# Patient Record
Sex: FEMALE | Race: WHITE
[De-identification: names, ages, dates, MRNs, and addresses within clinical notes are randomized per-mention and may not be internally consistent; named-entity substitution may affect disease eponyms.]

---

## 2015-07-24 VITALS — SYSTOLIC BLOOD PRESSURE: 141 MMHG | DIASTOLIC BLOOD PRESSURE: 75 MMHG

## 2020-02-04 ENCOUNTER — HOSPITAL ENCOUNTER (OUTPATIENT)
Dept: HOSPITAL 63 - LAB | Age: 65
Discharge: HOME | End: 2020-02-04
Attending: PSYCHIATRY & NEUROLOGY
Payer: MEDICARE

## 2020-02-04 DIAGNOSIS — F03.90: ICD-10-CM

## 2020-02-04 DIAGNOSIS — E55.9: ICD-10-CM

## 2020-02-04 DIAGNOSIS — R41.3: Primary | ICD-10-CM

## 2020-02-04 DIAGNOSIS — Z11.59: ICD-10-CM

## 2020-02-04 LAB
ALBUMIN SERPL-MCNC: 3.9 G/DL (ref 3.4–5)
ALBUMIN/GLOB SERPL: 1.1 {RATIO} (ref 1–1.7)
ALP SERPL-CCNC: 60 U/L (ref 46–116)
ALT SERPL-CCNC: 22 U/L (ref 14–59)
ANION GAP SERPL CALC-SCNC: 10 MMOL/L (ref 6–14)
AST SERPL-CCNC: 27 U/L (ref 15–37)
BASOPHILS # BLD AUTO: 0.1 X10^3/UL (ref 0–0.2)
BASOPHILS NFR BLD: 1 % (ref 0–3)
BILIRUB SERPL-MCNC: 0.2 MG/DL (ref 0.2–1)
BUN/CREAT SERPL: 12 (ref 6–20)
CA-I SERPL ISE-MCNC: 12 MG/DL (ref 7–20)
CALCIUM SERPL-MCNC: 9.1 MG/DL (ref 8.5–10.1)
CHLORIDE SERPL-SCNC: 106 MMOL/L (ref 98–107)
CO2 SERPL-SCNC: 27 MMOL/L (ref 21–32)
CREAT SERPL-MCNC: 1 MG/DL (ref 0.6–1)
EOSINOPHIL NFR BLD: 0.2 X10^3/UL (ref 0–0.7)
EOSINOPHIL NFR BLD: 2 % (ref 0–3)
ERYTHROCYTE [DISTWIDTH] IN BLOOD BY AUTOMATED COUNT: 13.9 % (ref 11.5–14.5)
GFR SERPLBLD BASED ON 1.73 SQ M-ARVRAT: 55.6 ML/MIN
GLOBULIN SER-MCNC: 3.7 G/DL (ref 2.2–3.8)
GLUCOSE SERPL-MCNC: 80 MG/DL (ref 70–99)
HCT VFR BLD CALC: 40.3 % (ref 36–47)
HGB BLD-MCNC: 13.4 G/DL (ref 12–15.5)
LYMPHOCYTES # BLD: 3.3 X10^3/UL (ref 1–4.8)
LYMPHOCYTES NFR BLD AUTO: 46 % (ref 24–48)
MCH RBC QN AUTO: 34 PG (ref 25–35)
MCHC RBC AUTO-ENTMCNC: 33 G/DL (ref 31–37)
MCV RBC AUTO: 104 FL (ref 79–100)
MONO #: 0.6 X10^3/UL (ref 0–1.1)
MONOCYTES NFR BLD: 8 % (ref 0–9)
NEUT #: 3 X10^3UL (ref 1.8–7.7)
NEUTROPHILS NFR BLD AUTO: 43 % (ref 31–73)
PLATELET # BLD AUTO: 59 X10^3/UL (ref 140–400)
PLATELET # BLD EST: (no result) 10*3/UL
POTASSIUM SERPL-SCNC: 4.1 MMOL/L (ref 3.5–5.1)
PROT SERPL-MCNC: 7.6 G/DL (ref 6.4–8.2)
RBC # BLD AUTO: 3.89 X10^6/UL (ref 3.5–5.4)
SODIUM SERPL-SCNC: 143 MMOL/L (ref 136–145)
T3 SERPL-MCNC: 95 NG/DL (ref 71–180)
T4 SERPL-MCNC: 6.3 UG/DL (ref 4.5–12)
WBC # BLD AUTO: 7.1 X10^3/UL (ref 4–11)

## 2020-02-04 PROCEDURE — 84436 ASSAY OF TOTAL THYROXINE: CPT

## 2020-02-04 PROCEDURE — 85025 COMPLETE CBC W/AUTO DIFF WBC: CPT

## 2020-02-04 PROCEDURE — 86592 SYPHILIS TEST NON-TREP QUAL: CPT

## 2020-02-04 PROCEDURE — 36415 COLL VENOUS BLD VENIPUNCTURE: CPT

## 2020-02-04 PROCEDURE — 80053 COMPREHEN METABOLIC PANEL: CPT

## 2020-02-04 PROCEDURE — 84443 ASSAY THYROID STIM HORMONE: CPT

## 2020-02-04 PROCEDURE — 82607 VITAMIN B-12: CPT

## 2020-02-04 PROCEDURE — 84480 ASSAY TRIIODOTHYRONINE (T3): CPT

## 2020-02-04 PROCEDURE — 82306 VITAMIN D 25 HYDROXY: CPT

## 2020-08-01 ENCOUNTER — HOSPITAL ENCOUNTER (EMERGENCY)
Dept: HOSPITAL 63 - ER | Age: 65
Discharge: HOME | End: 2020-08-01
Payer: MEDICARE

## 2020-08-01 VITALS
SYSTOLIC BLOOD PRESSURE: 135 MMHG | DIASTOLIC BLOOD PRESSURE: 73 MMHG | DIASTOLIC BLOOD PRESSURE: 73 MMHG | SYSTOLIC BLOOD PRESSURE: 135 MMHG | DIASTOLIC BLOOD PRESSURE: 73 MMHG | SYSTOLIC BLOOD PRESSURE: 135 MMHG

## 2020-08-01 VITALS — HEIGHT: 69 IN | BODY MASS INDEX: 26.78 KG/M2 | WEIGHT: 180.78 LBS

## 2020-08-01 DIAGNOSIS — Z88.5: ICD-10-CM

## 2020-08-01 DIAGNOSIS — K21.9: ICD-10-CM

## 2020-08-01 DIAGNOSIS — R91.8: Primary | ICD-10-CM

## 2020-08-01 LAB
ALBUMIN SERPL-MCNC: 3.9 G/DL (ref 3.4–5)
ALBUMIN/GLOB SERPL: 0.9 {RATIO} (ref 1–1.7)
ALP SERPL-CCNC: 89 U/L (ref 46–116)
ALT SERPL-CCNC: 13 U/L (ref 14–59)
ANION GAP SERPL CALC-SCNC: 9 MMOL/L (ref 6–14)
AST SERPL-CCNC: 15 U/L (ref 15–37)
BASOPHILS # BLD AUTO: 0.1 X10^3/UL (ref 0–0.2)
BASOPHILS NFR BLD: 1 % (ref 0–3)
BILIRUB SERPL-MCNC: 0.4 MG/DL (ref 0.2–1)
BUN/CREAT SERPL: 13 (ref 6–20)
CA-I SERPL ISE-MCNC: 12 MG/DL (ref 7–20)
CALCIUM SERPL-MCNC: 9.4 MG/DL (ref 8.5–10.1)
CHLORIDE SERPL-SCNC: 99 MMOL/L (ref 98–107)
CO2 SERPL-SCNC: 28 MMOL/L (ref 21–32)
CREAT SERPL-MCNC: 0.9 MG/DL (ref 0.6–1)
EOSINOPHIL NFR BLD: 0.3 X10^3/UL (ref 0–0.7)
EOSINOPHIL NFR BLD: 2 % (ref 0–3)
ERYTHROCYTE [DISTWIDTH] IN BLOOD BY AUTOMATED COUNT: 14 % (ref 11.5–14.5)
GFR SERPLBLD BASED ON 1.73 SQ M-ARVRAT: 62.6 ML/MIN
GLOBULIN SER-MCNC: 4.2 G/DL (ref 2.2–3.8)
GLUCOSE SERPL-MCNC: 115 MG/DL (ref 70–99)
HCT VFR BLD CALC: 39.4 % (ref 36–47)
HGB BLD-MCNC: 13.4 G/DL (ref 12–15.5)
LYMPHOCYTES # BLD: 2.8 X10^3/UL (ref 1–4.8)
LYMPHOCYTES NFR BLD AUTO: 23 % (ref 24–48)
MCH RBC QN AUTO: 35 PG (ref 25–35)
MCHC RBC AUTO-ENTMCNC: 34 G/DL (ref 31–37)
MCV RBC AUTO: 103 FL (ref 79–100)
MONO #: 0.9 X10^3/UL (ref 0–1.1)
MONOCYTES NFR BLD: 8 % (ref 0–9)
NEUT #: 8.3 X10^3UL (ref 1.8–7.7)
NEUTROPHILS NFR BLD AUTO: 67 % (ref 31–73)
PLATELET # BLD AUTO: 75 X10^3/UL (ref 140–400)
PLATELET # BLD EST: (no result) 10*3/UL
POLYCHROMASIA BLD QL SMEAR: SLIGHT
POTASSIUM SERPL-SCNC: 3.7 MMOL/L (ref 3.5–5.1)
PROT SERPL-MCNC: 8.1 G/DL (ref 6.4–8.2)
RBC # BLD AUTO: 3.83 X10^6/UL (ref 3.5–5.4)
SODIUM SERPL-SCNC: 136 MMOL/L (ref 136–145)
WBC # BLD AUTO: 12.4 X10^3/UL (ref 4–11)

## 2020-08-01 PROCEDURE — 99285 EMERGENCY DEPT VISIT HI MDM: CPT

## 2020-08-01 PROCEDURE — 85025 COMPLETE CBC W/AUTO DIFF WBC: CPT

## 2020-08-01 PROCEDURE — 80053 COMPREHEN METABOLIC PANEL: CPT

## 2020-08-01 PROCEDURE — 71260 CT THORAX DX C+: CPT

## 2020-08-01 PROCEDURE — 99284 EMERGENCY DEPT VISIT MOD MDM: CPT

## 2020-08-01 PROCEDURE — 36415 COLL VENOUS BLD VENIPUNCTURE: CPT

## 2020-08-01 NOTE — RAD
CT chest with contrast.

 

HISTORY: Lung mass

 

CT scan the chest was done using 75 mL Omnipaque 300 contrast. There is no

recent study for comparison. There is an old chest x-ray from 2015. 

Thyroid is homogeneous. There is no mediastinal adenopathy. There is no 

pleural effusion. Visualized portions the liver and spleen are 

unremarkable. Adrenal glands are normal. Left lung is free of infiltrates 

or nodules. There is a cavitary mass in the right upper lobe. There is an 

air-fluid level within the mass. There is mild infiltrate surrounding the 

mass especially peripherally. An abscess can have this pattern, cavitary 

lung cancer is possible. TB can have this pattern.

 

IMPRESSION:

1. Cavitary right upper lobe lung mass with surrounding infiltrate.

 

 

 

 

 

 

 

 

 

PQRS Compliance Statement:

 

One or more of the following individualized dose reduction techniques were

utilized for this examination:  

1. Automated exposure control  

2. Adjustment of the mA and/or kV according to patient size  

3. Use of iterative reconstruction technique

 

Electronically signed by: Slim Urias MD (8/1/2020 1:10 PM) [facility]

## 2020-08-01 NOTE — PHYS DOC
Past History


Past Medical History:  Anxiety, Bipolar, Depression, GERD


Past Surgical History:  Cholecystectomy, Hysterectomy


Alcohol Use:  Occasionally


Drug Use:  None





Adult General


Chief Complaint


Chief Complaint:  SHORTNESS OF BREATH





HPI


HPI





Patient with dementia and Parkinson's presents for shortness of breath.  

Patient's PCP is Dr. Pena who is in the middle of a hemoptysis/thoracic 

mass work-up in outpatient setting.  Patient has been more dyspneic, having 

episodes of hemoptysis, and overall generalized fatigue.  Patient seen by PCP 

yesterday and was advised to go to ED for evaluation.  Patient did not comply.  

Nonetheless, patient's daughter who heard of recommendation was concerned that 

her mother did not comply with PCPs orders and took patient to our ED for 

evaluation today.  On arrival, patient complains of chronic shortness of breath.

 She denies any constitutional symptoms.  She has not had any episodes of 

hemoptysis.  She is pending CT chest with contrast this upcoming Monday to 

better classify a concerning right lung lesion found by x-ray performed 

yesterday by PCP





Review of Systems


Review of Systems


Fourteen body systems of review of systems have been reviewed. See HPI for 

pertinent positives and negative responses, other wise all other systems are 

negative, non-pertinent or non-contributory





Allergies


Allergies





Allergies








Coded Allergies Type Severity Reaction Last Updated Verified


 


  codeine Allergy Unknown  7/24/15 No











Physical Exam


Physical Exam





Constitutional: Looks stated age, thin appearing, no acute distress, non-toxic 

appearance. []


HENT: Normocephalic, atraumatic, bilateral external ears normal, oropharynx 

moist, no oral exudates, nose normal. []


Eyes: PERRLA, EOMI, conjunctiva normal, no discharge. [] 


Neck: Normal range of motion, no tenderness, supple, no stridor. [] 


Cardiovascular:Heart rate regular rhythm, no murmur []


Lungs & Thorax: Coarse breath sounds to auscultation bilaterally, breathing 

through pursed lips with mild accessory muscle use of scalenes []


Abdomen: Bowel sounds normal, soft, no tenderness, no masses, no pulsatile 

masses. [] 


Skin: Warm, dry, no erythema, no rash. [] 


Back: No tenderness, no CVA tenderness. [] 


Extremities: No tenderness, no cyanosis, no clubbing, ROM grossly intact, no 

edema. [] 


Neurologic: Alert and oriented X 3, grossly intact motor and sensory function, 

no focal deficits noted. []


Psychologic: Affect normal, judgement normal, depressed mood. []





Current Patient Data


Vital Signs





Vital Signs








  Date Time  Temp Pulse Resp B/P (MAP) Pulse Ox O2 Delivery O2 Flow Rate FiO2


 


8/1/20 11:44 97.0 79 16 135/73 (93) 96 Room Air  








Lab Results





Laboratory Tests








Test


 8/1/20


12:09


 


White Blood Count 12.4 x10^3/uL 


 


Red Blood Count 3.83 x10^6/uL 


 


Hemoglobin 13.4 g/dL 


 


Hematocrit 39.4 % 


 


Mean Corpuscular Volume 103 fL 


 


Mean Corpuscular Hemoglobin 35 pg 


 


Mean Corpuscular Hemoglobin


Concent 34 g/dL 





 


Red Cell Distribution Width 14.0 % 


 


Platelet Count 75 x10^3/uL 


 


Neutrophils (%) (Auto) 67 % 


 


Lymphocytes (%) (Auto) 23 % 


 


Monocytes (%) (Auto) 8 % 


 


Eosinophils (%) (Auto) 2 % 


 


Basophils (%) (Auto) 1 % 


 


Neutrophils # (Auto) 8.3 x10^3uL 


 


Lymphocytes # (Auto) 2.8 x10^3/uL 


 


Monocytes # (Auto) 0.9 x10^3/uL 


 


Eosinophils # (Auto) 0.3 x10^3/uL 


 


Basophils # (Auto) 0.1 x10^3/uL 


 


Platelet Estimate Decreased 


 


Large Platelets Few 


 


Giant Platelets Few 


 


Polychromasia Slight 


 


Sodium Level 136 mmol/L 


 


Potassium Level 3.7 mmol/L 


 


Chloride Level 99 mmol/L 


 


Carbon Dioxide Level 28 mmol/L 


 


Anion Gap 9 


 


Blood Urea Nitrogen 12 mg/dL 


 


Creatinine 0.9 mg/dL 


 


Estimated GFR


(Cockcroft-Gault) 62.6 





 


BUN/Creatinine Ratio 13 


 


Glucose Level 115 mg/dL 


 


Calcium Level 9.4 mg/dL 


 


Total Bilirubin 0.4 mg/dL 


 


Aspartate Amino Transf


(AST/SGOT) 15 U/L 





 


Alanine Aminotransferase


(ALT/SGPT) 13 U/L 





 


Alkaline Phosphatase 89 U/L 


 


Total Protein 8.1 g/dL 


 


Albumin 3.9 g/dL 


 


Albumin/Globulin Ratio 0.9 








Current Medications








 Medications


  (Trade)  Dose


 Ordered  Sig/Lucy


 Route


 PRN Reason  Start Time


 Stop Time Status Last Admin


Dose Admin


 


 Iohexol


  (Omnipaque 300


 Mg/ml)  75 ml  1X  ONCE


 IV


   8/1/20 12:00


 8/1/20 12:10 DC  














EKG


EKG


[]





Radiology/Procedures


Radiology/Procedures





PROCEDURE: CT CHEST W/CONTRAST





CT chest with contrast.


 


HISTORY: Lung mass


 


CT scan the chest was done using 75 mL Omnipaque 300 contrast. There is no


recent study for comparison. There is an old chest x-ray from 2015. 


Thyroid is homogeneous. There is no mediastinal adenopathy. There is no 


pleural effusion. Visualized portions the liver and spleen are 


unremarkable. Adrenal glands are normal. Left lung is free of infiltrates 


or nodules. There is a cavitary mass in the right upper lobe. There is an 


air-fluid level within the mass. There is mild infiltrate surrounding the 


mass especially peripherally. An abscess can have this pattern, cavitary 


lung cancer is possible. TB can have this pattern.


 


IMPRESSION:


1. Cavitary right upper lobe lung mass with surrounding infiltrate.





 


PQRS Compliance Statement:


 


One or more of the following individualized dose reduction techniques were


utilized for this examination:  


1. Automated exposure control  


2. Adjustment of the mA and/or kV according to patient size  


3. Use of iterative reconstruction technique


 


Electronically signed by: Slim Urias MD (8/1/2020 1:10 PM) [facility]











Course & Med Decision Making


Course & Med Decision Making


Vital signs stable, comprehensive history and physical exam nonemergent for 

emergent invasive/surgical intervention


Pertinent labs and imaging performed, images were reviewed in depth with both 

patient and daughter


PCP was called and concerning finding of right upper lobe cavitary lesion was 

discussed, patient has pending referral to Dr. Lemos, pulmonologist at [facility]. 


I discussed patient's ED course, fact that she is hemodynamically stable, well-

appearing, and showing no signs or symptoms indicative of antibiotic use


Joint decision between myself, PCP, patient, and daughter to discharge home in 

stable condition with continued close outpatient follow-up


Strict return precautions discussed at length with good understanding by patient

 and daughter, all questions and concerns addressed prior to departure





Dragon Disclaimer


Dragon Disclaimer


This electronic medical record was generated, in whole or in part, using a voice

 recognition dictation system.





Departure


Departure:


Impression:  


   Primary Impression:  


   Cavitating mass in right upper lung lobe


Disposition:  01 HOME/RESIDENCE PRIOR TO ADM


Condition:  STABLE


Referrals:  


MARILEE PENA MD (PCP)





Additional Instructions:  


As discussed prior to your ED departure, your ED course and CT findings were 

discussed with your PCP, Dr. Pena


You were previously referred to Dr. Lemos, who is a pulmonologist (lung doctor) 

who is based out of [facility]


Your PCP was sent information regarding today's visit in addition to a copy of 

your CT scan image results





Justification of Admission:


Justification of Admission:


Justification of Admission Dx:  N/A











IRENE RAY DO                  Aug 1, 2020 11:43

## 2020-08-19 ENCOUNTER — HOSPITAL ENCOUNTER (OUTPATIENT)
Dept: HOSPITAL 63 - CT | Age: 65
Discharge: HOME | End: 2020-08-19
Payer: MEDICARE

## 2020-08-19 DIAGNOSIS — R91.8: Primary | ICD-10-CM

## 2020-08-19 PROCEDURE — 71046 X-RAY EXAM CHEST 2 VIEWS: CPT

## 2020-08-19 NOTE — RAD
EXAM: CHEST 2 VIEWS.

 

HISTORY: Lung mass.

 

COMPARISON: 08/01/2020, 07/24/2015.

 

FINDINGS: Frontal and lateral views of the chest are obtained.

 

A cavitary lesion containing an air-fluid level in the inferior aspect of 

the right upper lobe measures 5.0 x 4.7 cm. There is no pneumothorax or 

pleural effusion. The heart is not enlarged.

 

IMPRESSION:

1. 5.0 cm cavitary mass within the right upper lobe as seen on prior CT.

 

Electronically signed by: TOM Rice MD (8/19/2020 10:13 AM) 

[id number]

## 2020-08-24 ENCOUNTER — HOSPITAL ENCOUNTER (OUTPATIENT)
Dept: HOSPITAL 61 - INTRAD | Age: 65
Discharge: HOME | End: 2020-08-24
Attending: INTERNAL MEDICINE
Payer: MEDICARE

## 2020-08-24 VITALS — SYSTOLIC BLOOD PRESSURE: 118 MMHG | DIASTOLIC BLOOD PRESSURE: 67 MMHG

## 2020-08-24 VITALS — BODY MASS INDEX: 28.32 KG/M2 | HEIGHT: 61 IN | WEIGHT: 150 LBS

## 2020-08-24 VITALS — DIASTOLIC BLOOD PRESSURE: 67 MMHG | SYSTOLIC BLOOD PRESSURE: 117 MMHG

## 2020-08-24 VITALS — DIASTOLIC BLOOD PRESSURE: 68 MMHG | SYSTOLIC BLOOD PRESSURE: 133 MMHG

## 2020-08-24 VITALS — SYSTOLIC BLOOD PRESSURE: 101 MMHG | DIASTOLIC BLOOD PRESSURE: 67 MMHG

## 2020-08-24 VITALS — DIASTOLIC BLOOD PRESSURE: 55 MMHG | SYSTOLIC BLOOD PRESSURE: 107 MMHG

## 2020-08-24 VITALS — DIASTOLIC BLOOD PRESSURE: 71 MMHG | SYSTOLIC BLOOD PRESSURE: 144 MMHG

## 2020-08-24 VITALS — DIASTOLIC BLOOD PRESSURE: 67 MMHG | SYSTOLIC BLOOD PRESSURE: 122 MMHG

## 2020-08-24 VITALS — SYSTOLIC BLOOD PRESSURE: 114 MMHG | DIASTOLIC BLOOD PRESSURE: 58 MMHG

## 2020-08-24 VITALS — SYSTOLIC BLOOD PRESSURE: 118 MMHG | DIASTOLIC BLOOD PRESSURE: 65 MMHG

## 2020-08-24 VITALS — DIASTOLIC BLOOD PRESSURE: 62 MMHG | SYSTOLIC BLOOD PRESSURE: 113 MMHG

## 2020-08-24 VITALS — DIASTOLIC BLOOD PRESSURE: 65 MMHG | SYSTOLIC BLOOD PRESSURE: 119 MMHG

## 2020-08-24 VITALS — SYSTOLIC BLOOD PRESSURE: 111 MMHG | DIASTOLIC BLOOD PRESSURE: 75 MMHG

## 2020-08-24 VITALS — DIASTOLIC BLOOD PRESSURE: 65 MMHG | SYSTOLIC BLOOD PRESSURE: 116 MMHG

## 2020-08-24 VITALS — SYSTOLIC BLOOD PRESSURE: 109 MMHG | DIASTOLIC BLOOD PRESSURE: 75 MMHG

## 2020-08-24 DIAGNOSIS — Z79.899: ICD-10-CM

## 2020-08-24 DIAGNOSIS — Z72.89: ICD-10-CM

## 2020-08-24 DIAGNOSIS — F17.210: ICD-10-CM

## 2020-08-24 DIAGNOSIS — R91.8: Primary | ICD-10-CM

## 2020-08-24 DIAGNOSIS — Z88.5: ICD-10-CM

## 2020-08-24 DIAGNOSIS — Z79.01: ICD-10-CM

## 2020-08-24 LAB
ANION GAP SERPL CALC-SCNC: 9 MMOL/L (ref 6–14)
BASOPHILS # BLD AUTO: 0.1 X10^3/UL (ref 0–0.2)
BASOPHILS NFR BLD: 1 % (ref 0–3)
BUN SERPL-MCNC: 14 MG/DL (ref 7–20)
CALCIUM SERPL-MCNC: 8.7 MG/DL (ref 8.5–10.1)
CHLORIDE SERPL-SCNC: 103 MMOL/L (ref 98–107)
CO2 SERPL-SCNC: 29 MMOL/L (ref 21–32)
CREAT SERPL-MCNC: 1 MG/DL (ref 0.6–1)
EOSINOPHIL NFR BLD: 0.3 X10^3/UL (ref 0–0.7)
EOSINOPHIL NFR BLD: 3 % (ref 0–3)
ERYTHROCYTE [DISTWIDTH] IN BLOOD BY AUTOMATED COUNT: 14 % (ref 11.5–14.5)
GFR SERPLBLD BASED ON 1.73 SQ M-ARVRAT: 55.5 ML/MIN
GLUCOSE SERPL-MCNC: 106 MG/DL (ref 70–99)
HCT VFR BLD CALC: 35.7 % (ref 36–47)
HGB BLD-MCNC: 12 G/DL (ref 12–15.5)
LYMPHOCYTES # BLD: 2.7 X10^3/UL (ref 1–4.8)
LYMPHOCYTES NFR BLD AUTO: 24 % (ref 24–48)
MCH RBC QN AUTO: 35 PG (ref 25–35)
MCHC RBC AUTO-ENTMCNC: 34 G/DL (ref 31–37)
MCV RBC AUTO: 103 FL (ref 79–100)
MONO #: 1.1 X10^3/UL (ref 0–1.1)
MONOCYTES NFR BLD: 10 % (ref 0–9)
NEUT #: 7 X10^3/UL (ref 1.8–7.7)
NEUTROPHILS NFR BLD AUTO: 62 % (ref 31–73)
PLATELET # BLD AUTO: 84 X10^3/UL (ref 140–400)
PLATELET # BLD EST: (no result) 10*3/UL
POTASSIUM SERPL-SCNC: 3.9 MMOL/L (ref 3.5–5.1)
PROTHROMBIN TIME: 12.6 SEC (ref 11.7–14)
RBC # BLD AUTO: 3.47 X10^6/UL (ref 3.5–5.4)
SODIUM SERPL-SCNC: 141 MMOL/L (ref 136–145)
WBC # BLD AUTO: 11.2 X10^3/UL (ref 4–11)

## 2020-08-24 PROCEDURE — 88305 TISSUE EXAM BY PATHOLOGIST: CPT

## 2020-08-24 PROCEDURE — 99152 MOD SED SAME PHYS/QHP 5/>YRS: CPT

## 2020-08-24 PROCEDURE — 71045 X-RAY EXAM CHEST 1 VIEW: CPT

## 2020-08-24 PROCEDURE — C1892 INTRO/SHEATH,FIXED,PEEL-AWAY: HCPCS

## 2020-08-24 PROCEDURE — 77012 CT SCAN FOR NEEDLE BIOPSY: CPT

## 2020-08-24 PROCEDURE — 32405: CPT

## 2020-08-24 PROCEDURE — 87075 CULTR BACTERIA EXCEPT BLOOD: CPT

## 2020-08-24 PROCEDURE — 80048 BASIC METABOLIC PNL TOTAL CA: CPT

## 2020-08-24 PROCEDURE — 85025 COMPLETE CBC W/AUTO DIFF WBC: CPT

## 2020-08-24 PROCEDURE — 36415 COLL VENOUS BLD VENIPUNCTURE: CPT

## 2020-08-24 PROCEDURE — 87071 CULTURE AEROBIC QUANT OTHER: CPT

## 2020-08-24 PROCEDURE — 85610 PROTHROMBIN TIME: CPT

## 2020-08-24 PROCEDURE — A4215 STERILE NEEDLE: HCPCS

## 2020-08-24 PROCEDURE — 87116 MYCOBACTERIA CULTURE: CPT

## 2020-08-24 NOTE — RAD
CT-guided biopsy, cavitary lung mass, right middle lobe 8/24/2020



INDICATION: Cavitary mass, right upper lobe. 8/24/2020 10:36 AM

 

 

 The procedure was explained in its entirety to the patient or the patients

designated representative by a member of the treatment team, including a

discussion of the risks, benefits and commonly accepted alternatives to the

procedure, as well as the expected consequences of no therapy whatsoever.  

Discussion of the risks included, but was not limited to, those that are most

frequent and those that are rare but possibly severe or life-threatening, as

well as the possibility of unforeseen complications.



  All elements of maximal sterile barrier technique including the use of a

cap, mask, sterile gown, sterile gloves, large sterile sheet, appropriate hand

hygiene, and 2% chlorhexidine for cutaneous antisepsis (or acceptable

alternative antiseptic per current guidelines) were followed for this

procedure.



Patient was brought to the CT scanner and placed in the supine position. A

timeout procedure was performed. The anterior chest was prepped and draped

using sterile barrier technique. 1% lidocaine was administered for local

anesthesia. CT imaging was performed redemonstrating a predominantly cavitary

lesion in the right middle lobe with air-fluid level. The thick-walled area

seen superior and laterally within the mass is essentially unchanged from

comparison study



1% lidocaine was administered for local anesthesia. Under CT guidance a

17-gauge needle was advanced into the thickened rim of a cavitary lesion. Core

biopsies were obtained. A small pneumothorax was seen. Aspiration was

performed. Following several minutes CT was repeated demonstrating resolution

of the majority of pneumothorax. Manual pressure was held. Sterile dressings

were applied.



The procedures performed under conscious sedation including continuous

cardiopulmonary monitoring via dedicated sedation nurse. Face-to-face sedation

time: 30 minutes



IMPRESSION: CT-guided biopsy, cavitary mass right middle lobe. Small post

biopsy pneumothorax appears to be predominantly resolved. Follow-up

radiographs will be obtained.

 











PQRS Compliance Statement:



One or more of the following individualized dose reduction techniques were

utilized for this examination:  

1. Automated exposure control  

2. Adjustment of the mA and/or kV according to patient size  

3. Use of iterative reconstruction technique

## 2020-08-24 NOTE — NUR
Discharge Note:



FLAVIO CAAL



Discharge instructions and discharge home medications reviewed with Family Member and a copy 
given. All questions have been answered and understanding verbalized. 

Dressing site remains clean and dry, post biopsy CXR reviewed by Dr Cleveland.



The following instructions and handouts were given: lung biopsy, sedation



Discontinued lines and drains: Peripheral IV intact.



Patient discharged to Home or Self Care with Family Member via Wheelchair JAZMIN RN


-------------------------------------------------------------------------------

Addendum: 08/24/20 at 1151 by ALTHEA CABAN RN

-------------------------------------------------------------------------------

Amended: Links added.

## 2020-08-24 NOTE — RAD
EXAM: CHEST AP ONLY

 

INDICATION: Reason: LUNG Bx / Spl. Instructions:  / History: .

 

TECHNIQUE: Single view 

 

COMPARISON: CT guided lung biopsy 8/24/2020

 

FINDINGS:

 

The heart size is normal.

 

The great vessels appear unremarkable.

 

There is no hilar or mediastinal mass.

 

Lungs show cavitary lesion with soft tissue thickening at the dependent 

aspect, consistent with a targeted right middle lobe mass pursued for 

biopsy earlier the same day.

 

There is no pleural effusion or pneumothorax.

 

There are no significant osseous abnormalities.

 

IMPRESSION:

 

No pneumothorax or hemothorax status post CT-guided right lung biopsy of a

cavitary lesion in the right middle lobe.

 

 

Electronically signed by: Bari Gomez MD (8/24/2020 4:42 PM) 

[id number]

## 2020-08-27 NOTE — PATHOLOGY
[facility] Accession Number: [id number]

.                                                                01

Material submitted:                                        .

lung - RIGHT LUNG MASS BIOPSY. Modifiers: right

.                                                                01

Clinical history:                                          .

Cavitary Lung Mass Right Middle Lobe.

.                                                                02

**********************************************************************

Diagnosis:

Lung "right middle lobe lung mass", CT guided needle biopsy:

  - Mild interstitial fibrosis, with mixed acute and chronic inflammation

     with focally prominent eosinophils.

  - Negative for malignancy.

  - Please see comment.

(MLK:pit; 08/26/2020)

QTP  08/27/2020  1128 Local

**********************************************************************

.                                                                02

Comment:

The case is seen in co-review with Dr. Spencer Kerley who concurs with the

above diagnosis.

(MLK:pit; 08/26/2020)

.                                                                02

Electronically signed:                                     .

Wero Chen MD, Pathologist

NPI- [id number]

.                                                                01

Gross description:                                         .

The specimen is received in formalin, labeled "Kacey Mackay, right lung BX"

and consists of a delicate needle core of brown tissue measuring 0.8 cm in

length and 0.1 cm in diameter which is entirely submitted in A1.

(SDY; 8/24/2020)

SYU/SYU  08/24/2020  1608 Local

.                                                                02

Pathologist provided ICD-10:

J84.10, J98.4

.                                                                02

CPT                                                        .

[id number]

Specimen Comment: A courtesy copy of this report has been sent to [phone], [phone]

Specimen Comment: [id number]

Specimen Comment: Report sent to  / DR PENA

***Performed at:  01

   [facility]

   [address], [place], [place]  [id number]

   MD Gregory Nieves MD Phone:  [phone]

***Performed at:  02

   [facility]

   [address] [place], [place]  [id number]

   MD Get Hernandez MD Phone:  [phone]

## 2020-10-02 ENCOUNTER — HOSPITAL ENCOUNTER (OUTPATIENT)
Dept: HOSPITAL 63 - CT | Age: 65
Discharge: HOME | End: 2020-10-02
Payer: COMMERCIAL

## 2020-10-02 DIAGNOSIS — J98.11: Primary | ICD-10-CM

## 2020-10-02 DIAGNOSIS — R91.1: ICD-10-CM

## 2020-10-02 DIAGNOSIS — I25.10: ICD-10-CM

## 2020-10-02 DIAGNOSIS — Z90.49: ICD-10-CM

## 2020-10-02 PROCEDURE — 71250 CT THORAX DX C-: CPT

## 2020-10-02 NOTE — RAD
Examination: CT CHEST WO CONTRAST

 

History: Reason: LUNG NODULE / Spl. Instructions:  / History: 

 

Comparison/Correlation: 8/1/2020 CT chest with contrast, 8/19/2020  chest 

x-ray exam

 

Findings: Axial images of the chest were obtained without contrast. 

Sagittal and coronal reformatted images were provided.

 

Right middle lobe cavitary mass at the superior aspect abutting the major 

and minor fissure is increased in size since the previous fluid level seen

within the surrounding groundglass infiltrate is increased. Atelectasis at

the anterior aspect of this cavitary process has increased in the 

interval. Cavitary process currently measures 5 cm x 5.2 cm x 5.6 cm 

longitudinal. This represents an increase in size by 1.5 cm x 0.8 cm x 1.4

cm in longitudinal since the prior exam.

 

There is a new cavitary process involving the posterolateral right lung 

apex which currently measures 1.6 cm x 0.9 cm on axial image 31 series 4. 

This has developed in region of minimal infiltrate seen retrospectively). 

Punctate noncalcified nodules involving upper lung fields are seen and 

appear new in the interval.

 

No new consolidative processes.

 

Few nonenlarged superior sagittal lymph nodes are present.

 

No pneumothorax. No pleural or pericardial effusion. Thoracic aortic 

contour is remarkable. Coronary arterial calcifications identified. Bony 

structures are unremarkable for the patient's age. Cholecystectomy.

 

 

Impression:

Interval increase in right middle lobe cavitary process. Punctate new 

noncalcified nodules are seen in the upper lung fields.  Right apical 

small cavitary lesion in the interval.

Findings presumably relate to infectious etiology such as fungal process. 

Neoplastic etiology is necessarily excluded.

 

 

 

PQRS Compliance Statement:

 

One or more of the following individualized dose reduction techniques were

utilized for this examination:  

1. Automated exposure control  

2. Adjustment of the mA and/or kV according to patient size  

3. Use of iterative reconstruction technique

 

Electronically signed by: Reymundo Darnell MD (10/2/2020 1:01 PM) [id number]

## 2020-10-14 VITALS — DIASTOLIC BLOOD PRESSURE: 71 MMHG | SYSTOLIC BLOOD PRESSURE: 144 MMHG

## 2020-12-11 ENCOUNTER — HOSPITAL ENCOUNTER (OUTPATIENT)
Dept: HOSPITAL 61 - CT | Age: 65
End: 2020-12-11
Attending: INTERNAL MEDICINE
Payer: MEDICARE

## 2020-12-11 DIAGNOSIS — J85.2: Primary | ICD-10-CM

## 2020-12-11 DIAGNOSIS — R91.1: ICD-10-CM

## 2020-12-11 PROCEDURE — 71250 CT THORAX DX C-: CPT

## 2020-12-11 NOTE — RAD
Examination: CT THORAX WO



History:  LUNG ABCESS /  



Comparison/Correlation: 10/11/2020 CT chest without contrast



Findings: Axial images of the chest were obtained without contrast. Sagittal and coronal reformatted 
images were provided.



Right middle lobe cavitary collection again is identified. Cavitary component has increased in size b
y approximately 0.5 cm transverse currently measuring 4.5 cm and there is increased longitudinally in
 the cavitary component measuring 0.7 cm greater than previously currently at 5.5 cm. Increased wall 
thickening of this cavitary lesion is evident. There is interval consolidation about the lateral aspe
ct of this cavitary collection since the prior exam. Increased interstitial infiltrate superior to th
e cavitary lesion within the right upper lobe is present abutting the major fissure.



Subtle emphysematous involvement of the lung fields noted. No new pulmonary nodule or mass. Minimal r
ight costophrenic sulcus effusion noted.





Impression:

Increased right middle lobe cavitary lesion size. Increased thickening of the wall circumferentially,
 increased consolidation and interstitial thickening about the lesion.



Electronically signed by: Reymundo Darnell MD (12/11/2020 12:42 PM) [id number]